# Patient Record
Sex: MALE | Race: WHITE
[De-identification: names, ages, dates, MRNs, and addresses within clinical notes are randomized per-mention and may not be internally consistent; named-entity substitution may affect disease eponyms.]

---

## 2017-07-21 ENCOUNTER — HOSPITAL ENCOUNTER (OUTPATIENT)
Dept: HOSPITAL 47 - ORWHC2ENDO | Age: 77
Discharge: HOME | End: 2017-07-21
Payer: MEDICARE

## 2017-07-21 VITALS — RESPIRATION RATE: 18 BRPM | DIASTOLIC BLOOD PRESSURE: 63 MMHG | HEART RATE: 63 BPM | SYSTOLIC BLOOD PRESSURE: 115 MMHG

## 2017-07-21 VITALS — BODY MASS INDEX: 31.7 KG/M2

## 2017-07-21 VITALS — TEMPERATURE: 97 F

## 2017-07-21 DIAGNOSIS — E11.9: ICD-10-CM

## 2017-07-21 DIAGNOSIS — Z79.82: ICD-10-CM

## 2017-07-21 DIAGNOSIS — Z86.010: ICD-10-CM

## 2017-07-21 DIAGNOSIS — Z79.899: ICD-10-CM

## 2017-07-21 DIAGNOSIS — Z79.84: ICD-10-CM

## 2017-07-21 DIAGNOSIS — K57.30: ICD-10-CM

## 2017-07-21 DIAGNOSIS — Z87.891: ICD-10-CM

## 2017-07-21 DIAGNOSIS — Z12.11: Primary | ICD-10-CM

## 2017-07-21 LAB — GLUCOSE BLD-MCNC: 121 MG/DL (ref 75–99)

## 2017-07-21 NOTE — P.PCN
Date of Procedure: 07/21/17


Preoperative Diagnosis: 





Postoperative Diagnosis: 





Procedure(s) Performed: 


BRIEF HISTORY: Patient is a 76-year-old pleasant white male, scheduled for an 

elective colonoscopy as a part of evaluation of prior history of colon polyps.  

His last colonoscopy was 3 years ago and was noted to have a tubular adenoma.





PROCEDURE PERFORMED: Colonoscopy. 





PREOPERATIVE DIAGNOSIS: History of colon polyps. 





IV sedation per Anesthesia. 





PROCEDURE: After informed consent was obtained, the patient, was brought into 

the endoscopy unit. IV sedation was administered by Anesthesia under continuous 

monitoring.  Digital rectal examination was normal. Initially the Olympus CF-

160 flexible video colonoscope was then inserted in the rectum, gradually 

advanced into the cecum without any difficulty. Careful examination was 

performed as the scope was gradually being withdrawn. Ileocecal valve and the 

appendiceal orifice were visualized and appeared normal.  Prep was excellent. 

Mucosa of the cecum, ascending colon, transverse colon, descending colon, 

sigmoid colon, and rectum appeared normal.  Scattered sigmoid diverticulosis 

seen.  Retroflexion was performed in the rectum and no lesions were seen. The 

patient tolerated the procedure well. 





IMPRESSION: 


Normal-appearing colon from rectum to cecum .


Scattered sigmoid diverticulosis.





RECOMMENDATIONS:  Findings of this examination were discussed with the patient 

as well as his family.  He was advised to have a repeat surveillance 

colonoscopy in 5 years because of the prior history of colon polyps..





Implants: 





Indications for Procedure: 





Operative Findings: 





Description of Procedure:

## 2018-02-26 ENCOUNTER — HOSPITAL ENCOUNTER (OUTPATIENT)
Dept: HOSPITAL 47 - RADECHMAIN | Age: 78
Discharge: HOME | End: 2018-02-26
Payer: MEDICARE

## 2018-02-26 DIAGNOSIS — I08.1: Primary | ICD-10-CM

## 2018-02-26 PROCEDURE — 93306 TTE W/DOPPLER COMPLETE: CPT

## 2018-02-27 NOTE — ECHOF
Referral Reason:R94.31 Abnormal EKG



MEASUREMENTS

--------

HEIGHT: 165.1 cm

WEIGHT: 83.9 kg

BP: 152/57

RVIDd:   3.5 cm     (< 3.3)

IVSd:   1.4 cm     (0.6 - 1.1)

LVIDd:   4.4 cm     (3.9 - 5.3)

LVPWd:   1.4 cm     (0.6 - 1.1)

IVSs:   1.9 cm

LVIDs:   3.0 cm

LVPWs:   1.7 cm

LA Diam:   3.4 cm     (2.7 - 3.8)

LAESV Index (A-L):   38.71 ml/m

Ao Diam:   3.3 cm     (2.0 - 3.7)

AV Cusp:   1.5 cm     (1.5 - 2.6)

EPSS:   0.3 cm

MV E Rodríguez:   1.07 m/s

MV DecT:   244 ms

MV A Rodríguez:   0.99 m/s

MV E/A Ratio:   1.08 

AV maxP.61 mmHg

AV meanP.53 mmHg

RAP:   5.00 mmHg

RVSP:   31.76 mmHg

MV EF SLOPE:   61.38 mm/s     (70 - 150)

MV EXCURSION:   1.61 cm     (> 18.000)







FINDINGS

--------

Sinus rhythm.

This was a technically good study.

The left ventricular size is normal.   There is moderate concentric left ventricular hypertrophy.   O
verall left ventricular systolic function is normal with, an EF between 55 - 60 %.

The right ventricle is mildly enlarged.

LA is moderately dilated 34-39 ml/m2

The right atrium is normal in size.

There is mild to moderate aortic valve sclerosis.   There is mild aortic stenosis present.   Peak/tyler
n gradient across the Aortic Valve is 24.61mmHg / 13.53mmHg.

The mitral valve leaflets are mildly thickened.   Mild mitral annular calcification present.   Mild m
itral regurgitation is present.

Mild tricuspid regurgitation present.   Right ventricular systolic pressure is normal at < 35 mmHg.

Trace/mild (physiologic)  pulmonic regurgitation.

The aortic root size is normal.

Normal inferior vena cava with normal inspiratory collapse consistent with estimated right atrial pre
ssure of  5 mmHg.

There is no pericardial effusion.



CONCLUSIONS

--------

1. Sinus rhythm.

2. This was a technically good study.

3. The left ventricular size is normal.

4. There is moderate concentric left ventricular hypertrophy.

5. Overall left ventricular systolic function is normal with, an EF between 55 - 60 %.

6. The right ventricle is mildly enlarged.

7. LA is moderately dilated 34-39 ml/m2

8. The right atrium is normal in size.

9. There is mild to moderate aortic valve sclerosis.

10. There is mild aortic stenosis present.

11. Peak/mean gradient across the Aortic Valve is 24.61mmHg / 13.53mmHg.

12. The mitral valve leaflets are mildly thickened.

13. Mild mitral annular calcification present.

14. Mild mitral regurgitation is present.

15. Mild tricuspid regurgitation present.

16. Right ventricular systolic pressure is normal at < 35 mmHg.

17. Trace/mild (physiologic)  pulmonic regurgitation.

18. The aortic root size is normal.

19. Normal inferior vena cava with normal inspiratory collapse consistent with estimated right atrial
 pressure of  5 mmHg.

20. There is no pericardial effusion.





SONOGRAPHER: Ernst Mcfadden RDCS